# Patient Record
Sex: MALE | Race: OTHER | NOT HISPANIC OR LATINO | URBAN - METROPOLITAN AREA
[De-identification: names, ages, dates, MRNs, and addresses within clinical notes are randomized per-mention and may not be internally consistent; named-entity substitution may affect disease eponyms.]

---

## 2020-02-09 ENCOUNTER — INPATIENT (INPATIENT)
Facility: HOSPITAL | Age: 60
LOS: 3 days | Discharge: HOME | DRG: 552 | End: 2020-02-13
Attending: INTERNAL MEDICINE | Admitting: INTERNAL MEDICINE
Payer: COMMERCIAL

## 2020-02-09 VITALS
WEIGHT: 210.1 LBS | RESPIRATION RATE: 18 BRPM | TEMPERATURE: 98 F | HEIGHT: 72 IN | DIASTOLIC BLOOD PRESSURE: 70 MMHG | OXYGEN SATURATION: 99 % | HEART RATE: 61 BPM | SYSTOLIC BLOOD PRESSURE: 112 MMHG

## 2020-02-09 DIAGNOSIS — M54.9 DORSALGIA, UNSPECIFIED: ICD-10-CM

## 2020-02-09 LAB
ALBUMIN SERPL ELPH-MCNC: 4.1 G/DL — SIGNIFICANT CHANGE UP (ref 3.3–5)
ALP SERPL-CCNC: 57 U/L — SIGNIFICANT CHANGE UP (ref 40–120)
ALT FLD-CCNC: 46 U/L — HIGH (ref 10–45)
ANION GAP SERPL CALC-SCNC: 10 MMOL/L — SIGNIFICANT CHANGE UP (ref 5–17)
APPEARANCE UR: CLEAR — SIGNIFICANT CHANGE UP
APTT BLD: 29 SEC — SIGNIFICANT CHANGE UP (ref 27.5–36.3)
AST SERPL-CCNC: 27 U/L — SIGNIFICANT CHANGE UP (ref 10–40)
BACTERIA # UR AUTO: NEGATIVE — SIGNIFICANT CHANGE UP
BILIRUB SERPL-MCNC: 0.3 MG/DL — SIGNIFICANT CHANGE UP (ref 0.2–1.2)
BILIRUB UR-MCNC: NEGATIVE — SIGNIFICANT CHANGE UP
BUN SERPL-MCNC: 15 MG/DL — SIGNIFICANT CHANGE UP (ref 7–23)
CALCIUM SERPL-MCNC: 9.5 MG/DL — SIGNIFICANT CHANGE UP (ref 8.4–10.5)
CHLORIDE SERPL-SCNC: 104 MMOL/L — SIGNIFICANT CHANGE UP (ref 96–108)
CO2 SERPL-SCNC: 25 MMOL/L — SIGNIFICANT CHANGE UP (ref 22–31)
COLOR SPEC: COLORLESS — SIGNIFICANT CHANGE UP
CREAT SERPL-MCNC: 0.75 MG/DL — SIGNIFICANT CHANGE UP (ref 0.5–1.3)
DIFF PNL FLD: NEGATIVE — SIGNIFICANT CHANGE UP
EPI CELLS # UR: 0 /HPF — SIGNIFICANT CHANGE UP
GLUCOSE SERPL-MCNC: 207 MG/DL — HIGH (ref 70–99)
GLUCOSE UR QL: NEGATIVE — SIGNIFICANT CHANGE UP
HCT VFR BLD CALC: 46.7 % — SIGNIFICANT CHANGE UP (ref 39–50)
HGB BLD-MCNC: 15.2 G/DL — SIGNIFICANT CHANGE UP (ref 13–17)
HYALINE CASTS # UR AUTO: 0 /LPF — SIGNIFICANT CHANGE UP (ref 0–2)
INR BLD: 1.03 RATIO — SIGNIFICANT CHANGE UP (ref 0.88–1.16)
KETONES UR-MCNC: NEGATIVE — SIGNIFICANT CHANGE UP
LEUKOCYTE ESTERASE UR-ACNC: NEGATIVE — SIGNIFICANT CHANGE UP
MCHC RBC-ENTMCNC: 29.7 PG — SIGNIFICANT CHANGE UP (ref 27–34)
MCHC RBC-ENTMCNC: 32.5 GM/DL — SIGNIFICANT CHANGE UP (ref 32–36)
MCV RBC AUTO: 91.4 FL — SIGNIFICANT CHANGE UP (ref 80–100)
NITRITE UR-MCNC: NEGATIVE — SIGNIFICANT CHANGE UP
NRBC # BLD: 0 /100 WBCS — SIGNIFICANT CHANGE UP (ref 0–0)
PH UR: 7.5 — SIGNIFICANT CHANGE UP (ref 5–8)
PLATELET # BLD AUTO: 188 K/UL — SIGNIFICANT CHANGE UP (ref 150–400)
POTASSIUM SERPL-MCNC: 4.2 MMOL/L — SIGNIFICANT CHANGE UP (ref 3.5–5.3)
POTASSIUM SERPL-SCNC: 4.2 MMOL/L — SIGNIFICANT CHANGE UP (ref 3.5–5.3)
PROT SERPL-MCNC: 6.8 G/DL — SIGNIFICANT CHANGE UP (ref 6–8.3)
PROT UR-MCNC: NEGATIVE — SIGNIFICANT CHANGE UP
PROTHROM AB SERPL-ACNC: 11.7 SEC — SIGNIFICANT CHANGE UP (ref 10–12.9)
RBC # BLD: 5.11 M/UL — SIGNIFICANT CHANGE UP (ref 4.2–5.8)
RBC # FLD: 12.3 % — SIGNIFICANT CHANGE UP (ref 10.3–14.5)
RBC CASTS # UR COMP ASSIST: 1 /HPF — SIGNIFICANT CHANGE UP (ref 0–4)
SODIUM SERPL-SCNC: 139 MMOL/L — SIGNIFICANT CHANGE UP (ref 135–145)
SP GR SPEC: 1.01 — LOW (ref 1.01–1.02)
UROBILINOGEN FLD QL: NEGATIVE — SIGNIFICANT CHANGE UP
WBC # BLD: 7.01 K/UL — SIGNIFICANT CHANGE UP (ref 3.8–10.5)
WBC # FLD AUTO: 7.01 K/UL — SIGNIFICANT CHANGE UP (ref 3.8–10.5)
WBC UR QL: 0 /HPF — SIGNIFICANT CHANGE UP (ref 0–5)

## 2020-02-09 PROCEDURE — 72100 X-RAY EXAM L-S SPINE 2/3 VWS: CPT | Mod: 26

## 2020-02-09 PROCEDURE — 72148 MRI LUMBAR SPINE W/O DYE: CPT | Mod: 26

## 2020-02-09 PROCEDURE — 99285 EMERGENCY DEPT VISIT HI MDM: CPT

## 2020-02-09 RX ORDER — DEXAMETHASONE 0.5 MG/5ML
4 ELIXIR ORAL
Refills: 0 | Status: DISCONTINUED | OUTPATIENT
Start: 2020-02-09 | End: 2020-02-10

## 2020-02-09 RX ORDER — LIDOCAINE 4 G/100G
1 CREAM TOPICAL ONCE
Refills: 0 | Status: COMPLETED | OUTPATIENT
Start: 2020-02-09 | End: 2020-02-09

## 2020-02-09 RX ORDER — ACETAMINOPHEN 500 MG
650 TABLET ORAL EVERY 6 HOURS
Refills: 0 | Status: DISCONTINUED | OUTPATIENT
Start: 2020-02-09 | End: 2020-02-13

## 2020-02-09 RX ORDER — DEXAMETHASONE 0.5 MG/5ML
10 ELIXIR ORAL ONCE
Refills: 0 | Status: COMPLETED | OUTPATIENT
Start: 2020-02-09 | End: 2020-02-09

## 2020-02-09 RX ORDER — DIAZEPAM 5 MG
5 TABLET ORAL EVERY 8 HOURS
Refills: 0 | Status: DISCONTINUED | OUTPATIENT
Start: 2020-02-09 | End: 2020-02-11

## 2020-02-09 RX ORDER — INFLUENZA VIRUS VACCINE 15; 15; 15; 15 UG/.5ML; UG/.5ML; UG/.5ML; UG/.5ML
0.5 SUSPENSION INTRAMUSCULAR ONCE
Refills: 0 | Status: COMPLETED | OUTPATIENT
Start: 2020-02-09 | End: 2020-02-13

## 2020-02-09 RX ORDER — ACETAMINOPHEN 500 MG
975 TABLET ORAL ONCE
Refills: 0 | Status: COMPLETED | OUTPATIENT
Start: 2020-02-09 | End: 2020-02-09

## 2020-02-09 RX ORDER — DIAZEPAM 5 MG
5 TABLET ORAL ONCE
Refills: 0 | Status: DISCONTINUED | OUTPATIENT
Start: 2020-02-09 | End: 2020-02-09

## 2020-02-09 RX ADMIN — LIDOCAINE 1 PATCH: 4 CREAM TOPICAL at 16:35

## 2020-02-09 RX ADMIN — Medication 4 MILLIGRAM(S): at 21:27

## 2020-02-09 RX ADMIN — Medication 5 MILLIGRAM(S): at 21:27

## 2020-02-09 RX ADMIN — Medication 10 MILLIGRAM(S): at 16:35

## 2020-02-09 RX ADMIN — Medication 5 MILLIGRAM(S): at 16:34

## 2020-02-09 RX ADMIN — Medication 975 MILLIGRAM(S): at 16:34

## 2020-02-09 NOTE — ED PROVIDER NOTE - ATTENDING CONTRIBUTION TO CARE
I performed a history and physical exam of the patient and discussed their management with the resident/ACP. I reviewed the resident/ACP's note and agree with the documented findings and plan of care.     59M w/ severe back pain x 10 days, worse today after seeing a chiropractor. States that he tried Flexeril which did not help. Exacerbated by any movement, alleviated with keeping his knees elevated. Agree with rest of HPI as above.  PE remarkable for +SLR R side, no midline spinal tenderness, ROM limited sec. pain, strength 5/5 B/L UE, strength 4+/5 (pain limited) B/L LE, reflexes 2+, no saddle aesthesia, SILT.  Impression:  Back pain, no signs of cord compression syndrome but given unable to ambulate will provide pain medications, re-evaluate, if continues to be unable to ambulate will require admission for further work up and PT.

## 2020-02-09 NOTE — H&P ADULT - ATTENDING COMMENTS
dvt ppx (IPCs)  admit to inpatient  discussed with ER staff and neurosurgery.    Ja Waddell,   internal medicine  [phone]

## 2020-02-09 NOTE — ED ADULT NURSE NOTE - OBJECTIVE STATEMENT
59 y.o. male with PMH sciatica, chronic back pain/herniated discs presented to ED by EMS c/o lower back pain. Pt states he went to chiropractor around noon, and about an hour after he started having worsening back pain, unable to move, and unable to walk. Pt states he also went to same chiropractor on friday. Pt denies numbness, tingling, or loss of sensation in bilateral legs. Pt states pain radiates from lower back down legs, but is worse in R leg. Pt states he needs to lay on side to feel better. Pt denies loss of bowel or bladder function or SOB. Upon assessment, no loss of sensation in bilateral legs noted, skin warm and appropriate color, positive pedal pulses, legs able to be lifted to a certain point and pain worsened, R leg unable to lift as high as left and pain greater as verbalized by pt. Pt safety and comfort provided.

## 2020-02-09 NOTE — ED ADULT NURSE NOTE - NSIMPLEMENTINTERV_GEN_ALL_ED
Implemented All Fall Risk Interventions:  [place] to call system. Call bell, personal items and telephone within reach. Instruct patient to call for assistance. Room bathroom lighting operational. Non-slip footwear when patient is off stretcher. Physically safe environment: no spills, clutter or unnecessary equipment. Stretcher in lowest position, wheels locked, appropriate side rails in place. Provide visual cue, wrist band, yellow gown, etc. Monitor gait and stability. Monitor for mental status changes and reorient to person, place, and time. Review medications for side effects contributing to fall risk. Reinforce activity limits and safety measures with patient and family.

## 2020-02-09 NOTE — ED PROVIDER NOTE - OBJECTIVE STATEMENT
60 yo male in room 6 brought into the ER by EMS for evaluation of lower back pain. Pt states "I have had worsening back pain for the past 10 days. Pt states "I I had episodes of back pain 20 years ago and Indomethacin really helped. I had no further pain since then until ten days ago. I started to have pain when I was in the shower and I leaned over and immediately felt the pain. The pain progressively became worse. Friday I went to a chiropractor and I also went today. After today;s visit I felt much worse pain and now I can not stand or walk without a great deal of pain". Pt denies numbness tingling, weakness, loss of bowel or bladder function. Denies fevers/chills.   PT took a long motrin type drug of unknown name and flexiril prior to arrival.

## 2020-02-09 NOTE — H&P ADULT - HISTORY OF PRESENT ILLNESS
61 y/o M with no significant past med hx presenting for progressively worsening low back pain. He has had 2 weeks of waxing and waning low back pain with spasms. iT worsened the last 3 days. Had chiropractic treatments, and was taking motrin 600mg q8 hours the last 2 days, as well as toradol 60mg IM. He went for a chiropractic treatment earlier today and a few minutes later when back at home had severe spasm with weakness in the legs, to the point he was unable to support his body. He fell to the floor, and was unable to move due to spasm and pain. No fever or chills, no CP or SOB. no weight changes. denies saddle anesthesia. no bowel or bladder changes.  has radiating pain to the right leg, though no weakness at this time. now improving with steroids and valium.

## 2020-02-09 NOTE — H&P ADULT - NSHPPHYSICALEXAM_GEN_ALL_CORE
aaox3, in pain  rrr s1 s2 no murmurs  clear lungs no wheezing  abd soft nt nd  no edema  5/5 distal strength  sensation grossly intact  no focl deficits  severe pain with movement at trunk.   no rashes  cn ii-xii grossly intact.

## 2020-02-09 NOTE — H&P ADULT - NSHPLABSRESULTS_GEN_ALL_CORE
CBC Full  -  ( 09 Feb 2020 18:29 )  WBC Count : 7.01 K/uL  RBC Count : 5.11 M/uL  Hemoglobin : 15.2 g/dL  Hematocrit : 46.7 %  Platelet Count - Automated : 188 K/uL  Mean Cell Volume : 91.4 fl  Mean Cell Hemoglobin : 29.7 pg  Mean Cell Hemoglobin Concentration : 32.5 gm/dL  Auto Neutrophil # : x  Auto Lymphocyte # : x  Auto Monocyte # : x  Auto Eosinophil # : x  Auto Basophil # : x  Auto Neutrophil % : x  Auto Lymphocyte % : x  Auto Monocyte % : x  Auto Eosinophil % : x  Auto Basophil % : x

## 2020-02-09 NOTE — H&P ADULT - PROBLEM SELECTOR PLAN 1
decadron n4mg qd 6  tylenol 650mg qd  valium 5 mg tid prn   fall precuations  neurosurgery consult, Dr Perez  await MRI L spine.

## 2020-02-10 DIAGNOSIS — R73.09 OTHER ABNORMAL GLUCOSE: ICD-10-CM

## 2020-02-10 DIAGNOSIS — M54.16 RADICULOPATHY, LUMBAR REGION: ICD-10-CM

## 2020-02-10 LAB
CULTURE RESULTS: SIGNIFICANT CHANGE UP
HCV AB S/CO SERPL IA: 0.25 S/CO — SIGNIFICANT CHANGE UP (ref 0–0.99)
HCV AB SERPL-IMP: SIGNIFICANT CHANGE UP
SPECIMEN SOURCE: SIGNIFICANT CHANGE UP

## 2020-02-10 RX ORDER — DEXTROSE 50 % IN WATER 50 %
12.5 SYRINGE (ML) INTRAVENOUS ONCE
Refills: 0 | Status: DISCONTINUED | OUTPATIENT
Start: 2020-02-10 | End: 2020-02-13

## 2020-02-10 RX ORDER — DEXTROSE 50 % IN WATER 50 %
25 SYRINGE (ML) INTRAVENOUS ONCE
Refills: 0 | Status: DISCONTINUED | OUTPATIENT
Start: 2020-02-10 | End: 2020-02-13

## 2020-02-10 RX ORDER — DEXAMETHASONE 0.5 MG/5ML
6 ELIXIR ORAL EVERY 6 HOURS
Refills: 0 | Status: DISCONTINUED | OUTPATIENT
Start: 2020-02-10 | End: 2020-02-13

## 2020-02-10 RX ORDER — DEXTROSE 50 % IN WATER 50 %
15 SYRINGE (ML) INTRAVENOUS ONCE
Refills: 0 | Status: DISCONTINUED | OUTPATIENT
Start: 2020-02-10 | End: 2020-02-13

## 2020-02-10 RX ORDER — INSULIN LISPRO 100/ML
VIAL (ML) SUBCUTANEOUS
Refills: 0 | Status: DISCONTINUED | OUTPATIENT
Start: 2020-02-10 | End: 2020-02-13

## 2020-02-10 RX ORDER — OXYCODONE AND ACETAMINOPHEN 5; 325 MG/1; MG/1
1 TABLET ORAL EVERY 4 HOURS
Refills: 0 | Status: DISCONTINUED | OUTPATIENT
Start: 2020-02-10 | End: 2020-02-13

## 2020-02-10 RX ORDER — GLUCAGON INJECTION, SOLUTION 0.5 MG/.1ML
1 INJECTION, SOLUTION SUBCUTANEOUS ONCE
Refills: 0 | Status: DISCONTINUED | OUTPATIENT
Start: 2020-02-10 | End: 2020-02-13

## 2020-02-10 RX ORDER — INSULIN LISPRO 100/ML
VIAL (ML) SUBCUTANEOUS AT BEDTIME
Refills: 0 | Status: DISCONTINUED | OUTPATIENT
Start: 2020-02-10 | End: 2020-02-13

## 2020-02-10 RX ORDER — SODIUM CHLORIDE 9 MG/ML
1000 INJECTION, SOLUTION INTRAVENOUS
Refills: 0 | Status: DISCONTINUED | OUTPATIENT
Start: 2020-02-10 | End: 2020-02-13

## 2020-02-10 RX ADMIN — Medication 3: at 12:53

## 2020-02-10 RX ADMIN — Medication 5 MILLIGRAM(S): at 06:08

## 2020-02-10 RX ADMIN — Medication 4 MILLIGRAM(S): at 06:08

## 2020-02-10 RX ADMIN — OXYCODONE AND ACETAMINOPHEN 1 TABLET(S): 5; 325 TABLET ORAL at 10:00

## 2020-02-10 RX ADMIN — Medication 5 MILLIGRAM(S): at 21:30

## 2020-02-10 RX ADMIN — Medication 1: at 21:46

## 2020-02-10 RX ADMIN — OXYCODONE AND ACETAMINOPHEN 1 TABLET(S): 5; 325 TABLET ORAL at 09:37

## 2020-02-10 RX ADMIN — Medication 5 MILLIGRAM(S): at 12:54

## 2020-02-10 RX ADMIN — Medication 6 MILLIGRAM(S): at 18:28

## 2020-02-10 RX ADMIN — Medication 6 MILLIGRAM(S): at 12:53

## 2020-02-10 NOTE — PROGRESS NOTE ADULT - SUBJECTIVE AND OBJECTIVE BOX
seen and examined. no fever or chills. still with lots of pain with movement. States feels like his core is weak, and cannot support his body. right sided radiating pains slightly improving. no distal weakness. no bowel or bladder changes. no saddle anesthesia.     MEDICATIONS  (STANDING):  dexAMETHasone     Tablet 4 milliGRAM(s) Oral four times a day  diazepam    Tablet 5 milliGRAM(s) Oral every 8 hours  influenza   Vaccine 0.5 milliLiter(s) IntraMuscular once    MEDICATIONS  (PRN):  acetaminophen   Tablet .. 650 milliGRAM(s) Oral every 6 hours PRN Mild Pain (1 - 3), Moderate Pain (4 - 6), Severe Pain (7 - 10)      Allergies    No Known Allergies    Intolerances        Vital Signs Last 24 Hrs  T(C): 36.6 (10 Feb 2020 07:23), Max: 36.7 (2020 21:20)  T(F): 97.8 (10 Feb 2020 07:23), Max: 98 (2020 21:20)  HR: 72 (10 Feb 2020 07:23) (61 - 72)  BP: 110/70 (10 Feb 2020 07:23) (110/70 - 135/94)  BP(mean): --  RR: 18 (10 Feb 2020 07:23) (18 - 18)  SpO2: 94% (10 Feb 2020 07:23) (94% - 100%)    PHYSICAL EXAM:    aaox3, nad  rrr s1 s2 no murmurs  clear lungs non labored breathing.  abd soft nt nd  + SLR on the right  5/5 distal strength bilaterally. 5/5 at hip flexors.   no rashes  cn ii-xii grossly intact  no focal deficits      LABS:                        15.2   7.01  )-----------( 188      ( 2020 18:29 )             46.7     02-09    139  |  104  |  15  ----------------------------<  207<H>  4.2   |  25  |  0.75    Ca    9.5      2020 18:30    TPro  6.8  /  Alb  4.1  /  TBili  0.3  /  DBili  x   /  AST  27  /  ALT  46<H>  /  AlkPhos  57  02-09    PT/INR - ( 2020 18:29 )   PT: 11.7 sec;   INR: 1.03 ratio         PTT - ( 2020 18:29 )  PTT:29.0 sec  Urinalysis Basic - ( 2020 17:16 )    Color: Colorless / Appearance: Clear / S.007 / pH: x  Gluc: x / Ketone: Negative  / Bili: Negative / Urobili: Negative   Blood: x / Protein: Negative / Nitrite: Negative   Leuk Esterase: Negative / RBC: 1 /hpf / WBC 0 /HPF   Sq Epi: x / Non Sq Epi: 0 /hpf / Bacteria: Negative        RADIOLOGY & ADDITIONAL STUDIES:  xray no emergent findings  MRI L spine report pending

## 2020-02-10 NOTE — PROGRESS NOTE ADULT - PROBLEM SELECTOR PLAN 1
interventional radiology eval for possible Epidural injection  neurosurgery consult, Dr Perez, discussed. he will see patient later today  decadron 6mg q6 hours  valium 5mg tid   start percocet 1 tab po q8 hours prn pain  fall precautions.

## 2020-02-10 NOTE — CONSULT NOTE ADULT - ASSESSMENT
58 yo M with 2 weeks of progressively worsening low back pain and R sided radicular features down thigh MRI showing L3-4 disc  -continue decadron  -pain management  -Right L3-4 NI if possible during admission  -OOB  -PT   -f/u as outpatient

## 2020-02-10 NOTE — CONSULT NOTE ADULT - SUBJECTIVE AND OBJECTIVE BOX
60 yo M s/p recent travels presents with 2 weeks of low back pain progressively worsened over last 2 days. Pt had history of LBP treated with indocin 20 years ago.  Pt denies trauma.  Pt went to chiropractor 2 days ago and felt pain worsen.  Denies fever chills.  Pain in R leg mostly down anterior thigh above knee.    On exam the patient has full strength in Bilateral LE's proximal and distal  R knee extension limited by back pain    MRI of L-spine shows multilevel degenerative disc disease  R L3-4 paracentral disc herniation with lateral recess stenosis

## 2020-02-11 LAB — HBA1C BLD-MCNC: 7.1 % — HIGH (ref 4–5.6)

## 2020-02-11 PROCEDURE — 99222 1ST HOSP IP/OBS MODERATE 55: CPT

## 2020-02-11 RX ORDER — HEPARIN SODIUM 5000 [USP'U]/ML
5000 INJECTION INTRAVENOUS; SUBCUTANEOUS EVERY 12 HOURS
Refills: 0 | Status: DISCONTINUED | OUTPATIENT
Start: 2020-02-11 | End: 2020-02-13

## 2020-02-11 RX ORDER — NALOXEGOL OXALATE 12.5 MG/1
25 TABLET, FILM COATED ORAL DAILY
Refills: 0 | Status: DISCONTINUED | OUTPATIENT
Start: 2020-02-11 | End: 2020-02-13

## 2020-02-11 RX ORDER — SENNA PLUS 8.6 MG/1
2 TABLET ORAL AT BEDTIME
Refills: 0 | Status: DISCONTINUED | OUTPATIENT
Start: 2020-02-11 | End: 2020-02-11

## 2020-02-11 RX ORDER — DIAZEPAM 5 MG
10 TABLET ORAL
Refills: 0 | Status: DISCONTINUED | OUTPATIENT
Start: 2020-02-11 | End: 2020-02-13

## 2020-02-11 RX ORDER — CHLORHEXIDINE GLUCONATE 213 G/1000ML
1 SOLUTION TOPICAL DAILY
Refills: 0 | Status: DISCONTINUED | OUTPATIENT
Start: 2020-02-11 | End: 2020-02-13

## 2020-02-11 RX ORDER — SENNA PLUS 8.6 MG/1
2 TABLET ORAL EVERY 12 HOURS
Refills: 0 | Status: DISCONTINUED | OUTPATIENT
Start: 2020-02-11 | End: 2020-02-13

## 2020-02-11 RX ADMIN — SENNA PLUS 2 TABLET(S): 8.6 TABLET ORAL at 11:58

## 2020-02-11 RX ADMIN — SENNA PLUS 2 TABLET(S): 8.6 TABLET ORAL at 18:10

## 2020-02-11 RX ADMIN — Medication 6 MILLIGRAM(S): at 18:10

## 2020-02-11 RX ADMIN — Medication 3: at 12:42

## 2020-02-11 RX ADMIN — OXYCODONE AND ACETAMINOPHEN 1 TABLET(S): 5; 325 TABLET ORAL at 08:19

## 2020-02-11 RX ADMIN — Medication 2: at 09:07

## 2020-02-11 RX ADMIN — Medication 6 MILLIGRAM(S): at 08:19

## 2020-02-11 RX ADMIN — Medication 4: at 18:10

## 2020-02-11 RX ADMIN — HEPARIN SODIUM 5000 UNIT(S): 5000 INJECTION INTRAVENOUS; SUBCUTANEOUS at 18:10

## 2020-02-11 RX ADMIN — Medication 3: at 21:37

## 2020-02-11 RX ADMIN — NALOXEGOL OXALATE 25 MILLIGRAM(S): 12.5 TABLET, FILM COATED ORAL at 11:56

## 2020-02-11 RX ADMIN — Medication 6 MILLIGRAM(S): at 11:56

## 2020-02-11 RX ADMIN — OXYCODONE AND ACETAMINOPHEN 1 TABLET(S): 5; 325 TABLET ORAL at 11:59

## 2020-02-11 RX ADMIN — CHLORHEXIDINE GLUCONATE 1 APPLICATION(S): 213 SOLUTION TOPICAL at 11:59

## 2020-02-11 RX ADMIN — Medication 6 MILLIGRAM(S): at 00:47

## 2020-02-11 RX ADMIN — Medication 10 MILLIGRAM(S): at 18:09

## 2020-02-11 RX ADMIN — Medication 10 MILLIGRAM(S): at 09:19

## 2020-02-11 NOTE — CONSULT NOTE ADULT - ASSESSMENT
Patient is a 59y old  Male with intractable low back pain with right sciatica.    Would continue decadron as per primary service.  Continue percocet PRN and valium PRN.  Suggest to taper valium down and off before surgery, order robaxin 750 mg Q6 hours PRN.  Bowel regimen.  Monitor for sedation and respiratory supression    Pt states he is awaiting a LSO brace, not advisable for lumbar HNP or for long term use.  Would suggest strengthening low back muscles with PT.  Discussed pain management, but pt states he is trying to avoid any injection therapy.  If needed, local interventional pain management specialists were discussed (Dr Carpio, Dr Thomson, Dr Khoury).      Minutes spent on total encounter: 45 minutes      [facility]  [phone]

## 2020-02-11 NOTE — PROGRESS NOTE ADULT - PROBLEM SELECTOR PLAN 1
discussed with neurosurgery  LSO brace  increase valium to 10mg BID  percocet Q4 prn  bowel regimen.   decadron 6mg q6 hours  fall precautions.  PT consult

## 2020-02-11 NOTE — CHART NOTE - NSCHARTNOTEFT_GEN_A_CORE
Measure fit and deliver [place] custom fit LSO. Donned for fit orthosis fit well. Reviewed application skin precautions and care. Written instructions and contact information given. To notify office with any issues questions or concerns.  Lane BOLANOS  [facility]  [phone]

## 2020-02-11 NOTE — CONSULT NOTE ADULT - SUBJECTIVE AND OBJECTIVE BOX
Chief Complaint:  Patient is a 59y old  Male with intractable low back pain with right sciatica.    HPI:  59 y/o M with no significant past med hx presenting for progressively worsening low back pain. He has had 2 weeks of waxing and waning low back pain with spasms which started after he lifted his leg in the shower.  Had chiropractic treatments (which have worked in the past), and was taking motrin 600mg q8 hours the last 2 days, as well as toradol 60mg IM.  After his last chiropractic treatment when he returned home had severe spasm with weakness in the legs, to the point he was unable to support his body.  He fell to the floor, and was unable to move due to spasm and pain.   Denies saddle anesthesia. no bowel or bladder changes.  Has radiating pain to the right leg in a sciatica type fashion.  No subjective weakness at this time. Pain currently  improving with steroids, percocet and valium.       Current Pain Score: 2/ 10    Current out- patient pain regimen: none    Out Patient Pain Management provider: none    [facility] Prescription Monitoring Program: Reference #[id number]    PAST MEDICAL & SURGICAL HISTORY:  No pertinent past medical history  No significant past surgical history    FAMILY HISTORY:  No pertinent family history in first degree relatives    SOCIAL HISTORY:  Tobacco Use: denies  Alcohol Use: denies  Illcicit Drug Use: denies    Opioid Risk Tool (ORT-OUD) Score: low    Allergies    No Known Allergies    Intolerances    none    REVIEW OF SYSTEMS:  CONSTITUTIONAL: No fever, weight loss, or fatigue  NEUROLOGICAL: No headaches, memory loss, loss of strength, numbness, tremors, dizziness or blurred vision  RESPIRATORY: No shortness of breath  CARDIOVASCULAR: No chest pain, palpitations  GASTROINTESTINAL: No abdominal pain, nausea, vomiting, diarrhea or constipation.  : no bladder incontinence/retention  MUSCULOSKELETAL: + back pain with right lower extremity pain, + low back spasm; denies lower motor strength weakness; denies saddle anesthesia  SKIN: No itching, burning, rashes, or lesions   PSYCHIATRIC: No depression, anxiety, mood swings, or difficulty sleeping      MEDICATIONS  (STANDING):  chlorhexidine 2% Cloths 1 Application(s) Topical daily  dexAMETHasone     Tablet 6 milliGRAM(s) Oral every 6 hours  dextrose 5%. 1000 milliLiter(s) (50 mL/Hr) IV Continuous <Continuous>  dextrose 50% Injectable 12.5 Gram(s) IV Push once  dextrose 50% Injectable 25 Gram(s) IV Push once  dextrose 50% Injectable 25 Gram(s) IV Push once  diazepam    Tablet 10 milliGRAM(s) Oral two times a day  heparin  Injectable 5000 Unit(s) SubCutaneous every 12 hours  influenza   Vaccine 0.5 milliLiter(s) IntraMuscular once  insulin lispro (HumaLOG) corrective regimen sliding scale   SubCutaneous three times a day before meals  insulin lispro (HumaLOG) corrective regimen sliding scale   SubCutaneous at bedtime  naloxegol 25 milliGRAM(s) Oral daily  senna 2 Tablet(s) Oral every 12 hours    MEDICATIONS  (PRN):  acetaminophen   Tablet .. 650 milliGRAM(s) Oral every 6 hours PRN Mild Pain (1 - 3), Moderate Pain (4 - 6), Severe Pain (7 - 10)  dextrose 40% Gel 15 Gram(s) Oral once PRN Blood Glucose LESS THAN 70 milliGRAM(s)/deciliter  glucagon  Injectable 1 milliGRAM(s) IntraMuscular once PRN Glucose LESS THAN 70 milligrams/deciliter  oxycodone    5 mG/acetaminophen 325 mG 1 Tablet(s) Oral every 4 hours PRN Moderate Pain (4 - 6)      PHYSICAL EXAM  Seen at bedside, NAD, well-groomed, well-developed, no signs of toxicity  Alert & Oriented X3, Good concentration; Cranial Nerves II-XII intact; sensory exam intact  Lungs Clear to auscultation bilaterally, no rales or rhonchi  Heart Regular rate and rhythm, S1 S; no JVD  Abdomen Soft, Nontender, Nondistended; Bowel sounds present, no BM; voiding; appetite good  Extremities 2+ Peripheral Pulses, No cyanosis or edema  Motor Strength 5/5 B/L upper and lower extremities; moves all extremities equally; Straight Leg Raise + on right leg at approx 20 degrees; OOB ambulating with walker  SKIN: No rashes or lesions    Vital Signs:  T(C): 36.8 (02-11-20 @ 07:43)  HR: 80 (02-11-20 @ 07:43)  BP: 116/76 (02-11-20 @ 07:43)  RR: 18 (02-11-20 @ 07:43)  SpO2: 95% (02-11-20 @ 07:43)    Pertinent labs/radiology:  AM labs reviewed.  Lumbar xray reviewed.  Lumbar MRI reviewed: L3-4 right HNP with compression of the descending right L4 nerve.

## 2020-02-12 PROCEDURE — 99231 SBSQ HOSP IP/OBS SF/LOW 25: CPT

## 2020-02-12 RX ADMIN — OXYCODONE AND ACETAMINOPHEN 1 TABLET(S): 5; 325 TABLET ORAL at 07:51

## 2020-02-12 RX ADMIN — Medication 10 MILLIGRAM(S): at 06:51

## 2020-02-12 RX ADMIN — Medication 4: at 18:02

## 2020-02-12 RX ADMIN — CHLORHEXIDINE GLUCONATE 1 APPLICATION(S): 213 SOLUTION TOPICAL at 11:34

## 2020-02-12 RX ADMIN — Medication 6 MILLIGRAM(S): at 06:56

## 2020-02-12 RX ADMIN — HEPARIN SODIUM 5000 UNIT(S): 5000 INJECTION INTRAVENOUS; SUBCUTANEOUS at 17:14

## 2020-02-12 RX ADMIN — Medication 3: at 21:54

## 2020-02-12 RX ADMIN — Medication 3: at 07:57

## 2020-02-12 RX ADMIN — Medication 10 MILLIGRAM(S): at 17:14

## 2020-02-12 RX ADMIN — OXYCODONE AND ACETAMINOPHEN 1 TABLET(S): 5; 325 TABLET ORAL at 06:51

## 2020-02-12 RX ADMIN — Medication 6 MILLIGRAM(S): at 11:34

## 2020-02-12 RX ADMIN — Medication 6 MILLIGRAM(S): at 17:14

## 2020-02-12 RX ADMIN — SENNA PLUS 2 TABLET(S): 8.6 TABLET ORAL at 06:51

## 2020-02-12 RX ADMIN — Medication 6 MILLIGRAM(S): at 00:06

## 2020-02-12 RX ADMIN — NALOXEGOL OXALATE 25 MILLIGRAM(S): 12.5 TABLET, FILM COATED ORAL at 12:21

## 2020-02-12 RX ADMIN — Medication 3: at 13:25

## 2020-02-12 RX ADMIN — HEPARIN SODIUM 5000 UNIT(S): 5000 INJECTION INTRAVENOUS; SUBCUTANEOUS at 06:52

## 2020-02-12 NOTE — PROGRESS NOTE ADULT - PROBLEM SELECTOR PLAN 1
discussed with neurosurgery  LSO brace - discussed should be used for comfort, and not long term  stressed importance of weight loss and core strengthening with PT once out of current flare.

## 2020-02-12 NOTE — PROGRESS NOTE ADULT - PROBLEM SELECTOR PLAN 2
plan for discharge tomorrow morning, with walker for prn use at home.  LSO  decrease valium to 5mg BID  oxycodone 5mg TID prn   plan for prednisone taper 40mg qd x 3 days, 30mg qd for 3days., 20mg qd for 3 days, then 10mg daily for 3 days  decrease decadron to 4mg q8 hours  will plan to discharge on movantik and senna.

## 2020-02-12 NOTE — PROGRESS NOTE ADULT - SUBJECTIVE AND OBJECTIVE BOX
seen and examined. states greatly improving. now with LSO. walking much better. still hesitant with prolonged sitting. no BM yet. No CP or SOB. no radiating pains at current. transitions improving. admits to urinary urgency    MEDICATIONS  (STANDING):  chlorhexidine 2% Cloths 1 Application(s) Topical daily  dexAMETHasone     Tablet 6 milliGRAM(s) Oral every 6 hours  dextrose 5%. 1000 milliLiter(s) (50 mL/Hr) IV Continuous <Continuous>  dextrose 50% Injectable 12.5 Gram(s) IV Push once  dextrose 50% Injectable 25 Gram(s) IV Push once  dextrose 50% Injectable 25 Gram(s) IV Push once  diazepam    Tablet 10 milliGRAM(s) Oral two times a day  heparin  Injectable 5000 Unit(s) SubCutaneous every 12 hours  influenza   Vaccine 0.5 milliLiter(s) IntraMuscular once  insulin lispro (HumaLOG) corrective regimen sliding scale   SubCutaneous three times a day before meals  insulin lispro (HumaLOG) corrective regimen sliding scale   SubCutaneous at bedtime  naloxegol 25 milliGRAM(s) Oral daily  senna 2 Tablet(s) Oral every 12 hours    MEDICATIONS  (PRN):  acetaminophen   Tablet .. 650 milliGRAM(s) Oral every 6 hours PRN Mild Pain (1 - 3), Moderate Pain (4 - 6), Severe Pain (7 - 10)  dextrose 40% Gel 15 Gram(s) Oral once PRN Blood Glucose LESS THAN 70 milliGRAM(s)/deciliter  glucagon  Injectable 1 milliGRAM(s) IntraMuscular once PRN Glucose LESS THAN 70 milligrams/deciliter  oxycodone    5 mG/acetaminophen 325 mG 1 Tablet(s) Oral every 4 hours PRN Moderate Pain (4 - 6)      Allergies    No Known Allergies    Intolerances        Vital Signs Last 24 Hrs  T(C): 37 (12 Feb 2020 07:51), Max: 37 (12 Feb 2020 07:51)  T(F): 98.6 (12 Feb 2020 07:51), Max: 98.6 (12 Feb 2020 07:51)  HR: 69 (12 Feb 2020 07:51) (69 - 90)  BP: 122/74 (12 Feb 2020 07:51) (122/74 - 128/76)  BP(mean): --  RR: 18 (12 Feb 2020 07:51) (18 - 18)  SpO2: 94% (12 Feb 2020 07:51) (85% - 94%)    PHYSICAL EXAM:    aaox3m, nad  rrr s1 s2 no murmurs  clear lungs  LSO on  walking independantly. with walker.   no weakness. normal sensation  no rashes    LABS:  A1C 7.1  fingersticks reviewed.     RADIOLOGY & ADDITIONAL STUDIES:

## 2020-02-12 NOTE — PROGRESS NOTE ADULT - SUBJECTIVE AND OBJECTIVE BOX
Chief Complaint:  Patient is a 59y old  Male with intractable low back pain with right sciatica.    HPI:  61 y/o M with no significant past med hx presenting for progressively worsening low back pain. He has had 2 weeks of waxing and waning low back pain with spasms which started after he lifted his leg in the shower.  Had chiropractic treatments (which have worked in the past), and was taking motrin 600mg q8 hours the last 2 days, as well as toradol 60mg IM.  After his last chiropractic treatment when he returned home had severe spasm with weakness in the legs, to the point he was unable to support his body.  He fell to the floor, and was unable to move due to spasm and pain.   Denies saddle anesthesia. no bowel or bladder changes.  Has radiating pain to the right leg in a sciatica type fashion.  No subjective weakness at this time. Pain currently  improving with steroids, percocet and valium.       Current Pain Score: 2/ 10    Current in-patient pain therapy:  MEDICATIONS  (STANDING):  chlorhexidine 2% Cloths 1 Application(s) Topical daily  dexAMETHasone     Tablet 6 milliGRAM(s) Oral every 6 hours  dextrose 5%. 1000 milliLiter(s) (50 mL/Hr) IV Continuous <Continuous>  dextrose 50% Injectable 12.5 Gram(s) IV Push once  dextrose 50% Injectable 25 Gram(s) IV Push once  dextrose 50% Injectable 25 Gram(s) IV Push once  diazepam    Tablet 10 milliGRAM(s) Oral two times a day  heparin  Injectable 5000 Unit(s) SubCutaneous every 12 hours  influenza   Vaccine 0.5 milliLiter(s) IntraMuscular once  insulin lispro (HumaLOG) corrective regimen sliding scale   SubCutaneous three times a day before meals  insulin lispro (HumaLOG) corrective regimen sliding scale   SubCutaneous at bedtime  naloxegol 25 milliGRAM(s) Oral daily  senna 2 Tablet(s) Oral every 12 hours    MEDICATIONS  (PRN):  acetaminophen   Tablet .. 650 milliGRAM(s) Oral every 6 hours PRN Mild Pain (1 - 3), Moderate Pain (4 - 6), Severe Pain (7 - 10)  dextrose 40% Gel 15 Gram(s) Oral once PRN Blood Glucose LESS THAN 70 milliGRAM(s)/deciliter  glucagon  Injectable 1 milliGRAM(s) IntraMuscular once PRN Glucose LESS THAN 70 milligrams/deciliter  oxycodone    5 mG/acetaminophen 325 mG 1 Tablet(s) Oral every 4 hours PRN Moderate Pain (4 - 6)      PHYSICAL EXAM  Seen while ambulating in the hallway, LSO brace on and utilizing walker with PT.  Alert & Oriented X3,  NAD, appears comfortable.  Motor Strength 5/5 B/L upper and lower extremities; moves all extremities equally; gait steady.  States he is feeling much improved, no new complaints.    T(C): 37 (02-12-20 @ 07:51)  HR: 69 (02-12-20 @ 07:51)  BP: 122/74 (02-12-20 @ 07:51)  RR: 18 (02-12-20 @ 07:51)  SpO2: 94% (02-12-20 @ 07:51)      Pertinent labs, radiology, additional studies:  Labs reviewed, glucose elevated, HA1C 7.1

## 2020-02-12 NOTE — PHYSICAL THERAPY INITIAL EVALUATION ADULT - SITTING BALANCE: DYNAMIC
Reason for admission   hyperglycemia  child disease relapse     HPI  This is a pleasant 60 year old female long standing history of Wegener's granulomatosis which per report has been resistant to cyclophosphamide. She has an extensive treatment history which Is detailed in her consultants note. She presented to follow up with Dr Zeng (see his note), from where is directed for admission. She refers that over the last month has had an array of progressive symptoms including fatigue/tiredness, dyspnea which has progressive worsened with exertion, anorexia, dry cough. In addition, she has been experiencing urinary symptoms of pressure and frequency including in recent days. During the clinic visit her was as high as 460s, which she corroborates has been the case at home ranging from 400-500 despite actively using her insulin. Dr Zeng, has requested her admission for evaluation and management of her symptoms ; there is concern as to whether this may recurring disease.      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   • Closed fracture of humerus 6/16/2016   • COPD (chronic obstructive pulmonary disease) (CMS/[facility])    • Depression    • Developmental dyslexia    • Diabetes mellitus (CMS/[facility])     DM 2, treated with insulin   • Difficult intubation    • Fatty liver    • GERD (esophageal reflux)    • H/O tracheostomy    • Hyperlipidemia    • Hypertension    • Immunosuppression (CMS/[facility])     chronic   • Mitral valve disorders(424.0)    • Osteoarthrosis     lower leg   • Osteopenia 12/2013    Per DEXA   • Pneumonia due to Staphylococcus (CMS/[facility]) 4/13/2011   • Pulmonary hemorrhage 01/01/2008   • RAD (reactive airway disease)    • Sinusitis, chronic    •  Staphylococcus aureus infection    • Uncomplicated senile dementia    • Urinary tract infection    • Vasculitis (CMS/HCC)    • Wegener's disease, pulmonary (CMS/HCC)    • Wegener's granulomatosis (CMS/HCC)        Past Surgical History:   Procedure Laterality Date   • Bronchoscopy with fluro guide     • Bunionectomy  1995    Left   • D and c  1982   • Fracture surgery     • Leg/ankle surgery proc unlisted  06/01/2004    Right ankle surgery   • Past surgical history  10/01/2007    Left lung biopsy/VATS procedure   • Past surgical history  1995    Bunionectomy, L foot   • Past surgical history  1999    Sinus surgery       ALLERGIES:   Allergen Reactions   • Codeine    • Pentamidine SHORTNESS OF BREATH   • Lantus [Insulin Glargine] RASH     The patient current takes Lantus. The patient states that she developed a rash after taking a new brand of Lantus. The brand that the patient is allergic to is Toujeo/solo star insulin glargine injection    • Ciprofloxacin RASH     Tolerates Levaquin   • Lantus Other (See Comments)   • Levemir [Insulin Detemir] Other (See Comments)     Lumps    • Msir [Morphine] PRURITUS     Patient seems to tolerate MS Contin without pruritis    • Omnicef [Cefdinir] DIARRHEA     Tolerated cefepime    • Quinolones    • Sulfa Antibiotics RASH       Medications : PTA MEDS reviewed in MAR     ROS : 10 systems review is negative except for pertinent to HPI    Family History   Problem Relation Age of Onset   • Stroke Mother    • Psychiatry Mother         mental illness   • Diabetes Brother         x2   • Hypertension Brother         x2   • Diabetes Brother    • Stroke Father    • High blood pressure Father    • Depression Father    • Psychiatric Father    • Thyroid Father    • Allergies Daughter    • Depression Daughter    • Kidney disease Daughter    • Other Daughter         headaches   • Allergies Daughter    • Other Daughter         headaches       Social History     Social History   • Marital status:       Spouse name: N/A   • Number of children: 3   • Years of education: N/A     Occupational History   •  [employer]     Social History Main Topics   • Smoking status: Former Smoker     Years: 5.00     Types: Cigarettes     Quit date: 1/1/1988   • Smokeless tobacco: Never Used      Comment: Pt reported smoking one pack per month   • Alcohol use 1.2 oz/week     2 Cans of beer per week      Comment: 1-2 times a week   • Drug use: No   • Sexual activity: No     Other Topics Concern   • Exercise Yes     walks some      Social History Narrative    Lives with daughter who works as .           Physical Exam   Visit Vitals  /64 (BP Location: [facility], Patient Position: Sitting)   Pulse 124   Temp 98.9 °F (37.2 °C) (Oral)   Resp 18   Ht 5' 1\" (1.549 m)   Wt 60.3 kg   LMP 06/25/2001   SpO2 94%   BMI 25.12 kg/m²     GEN : well developed ; pleasant and cooperative with evaluation. Does not appear in acute distress.   ENT no icterus, mild conjunctival pallor, EOMI, PERRLA  CHEST: wall without rash ; symmetric movement   CARD S1S2 regular, no MGR  LUNGS Clear To Auscultation bilat ; adequate expansion : good saturation with 2L NC   ABD +BS soft NTND, no organomegaly  EXT no edema, no synovitis  SKIN integrity intact  NEURO Alert and Oriented to time/person/place and condition; CN intact, no gross focal motor (5/5 strength symmetric) nor sensory deficits appreciated to light touch.     LABS and IMAGES   WBC (K/mcL)   Date Value   06/14/2018 10.8     HGB (g/dL)   Date Value   06/14/2018 13.0     PLT   Date Value   06/14/2018 348 K/mcL   02/12/2014 179 K/mcL   08/30/2012 183 K/uL     INR (no units)   Date Value   11/19/2017 1.0       BUN (mg/dL)   Date Value   06/14/2018 14     Creatinine (mg/dL)   Date Value   06/14/2018 0.83     Sodium (mmol/L)   Date Value   06/14/2018 130 (L)     Chloride (mmol/L)   Date Value   06/14/2018 92 (L)     Potassium (mmol/L)   Date Value   06/14/2018 3.6       AST/SGOT  (Units/L)   Date Value   06/14/2018 26     ALT/SGPT (Units/L)   Date Value   06/14/2018 22     No results found for: GGTP  ALK PHOSPHATASE (Units/L)   Date Value   06/14/2018 89     TOTAL BILIRUBIN (mg/dL)   Date Value   06/14/2018 0.3     Assessment and Plan :  1. Uncontrolled type 2 diabetes mellitus with hyperglycemia requiring insulin gtt for improved control. Last A1c on file is >14 in April. Has mild Ketones but GAP is closed, not DKA. For now plan will be to continue insulin gtt. May require involvement of endocrinology, given potential need for high dose steroids pending further defining if Wegener's is cause of other symptoms.     2. Recurrent Acute urinary tract infection : confirmed with new UA. Based on past cultures, will start on rocephin IV pending culture results and susceptibility.     3. Hyponatremia assoc. To Hyperglycemia : corrected within normal parameters of 136  ; monitor as needed     4. Dyspnea : worsening over course last few weeks with dry cough. In light of base diagnosis, will proceed with CT chest to assess parenchyma.     5. Wegener's Granulomatosis : seem has been on remicade, prednisone and cellcept : I will defer guidance to management to Dr Zeng. At this time will resume prednisone and cellcept pending additional testing and input     6. DVT prophylaxis : for now mechanical prophylaxis. Pending further eval consider addition of heparin     7. GI prophylaxis : given long use of steroids, continue PPI or H2B for PUD prophylaxis     8. Insomnia : resume melatonin and elavil       Goals of Care:    Patient is decisional: Yes  Patient has POA documents in the chart: Yes  Code Status: Full Code  Rationale behind code status: corroborated decisions documented on POC from 4/23/15 : refer to document for additional details   Goals of care: improvement of glycemia, urinary symptoms and dyspnea. Prompt return home       Sloane Sultana MD - Primary Care Provider            normal balance

## 2020-02-12 NOTE — PROGRESS NOTE ADULT - PROBLEM SELECTOR PLAN 3
monitor fingersticks TID AC  A1C 7.1  sliding scale insulin. while hospital.  Will start metformin 1000mg bid upon discharge.

## 2020-02-13 ENCOUNTER — TRANSCRIPTION ENCOUNTER (OUTPATIENT)
Age: 60
End: 2020-02-13

## 2020-02-13 VITALS
TEMPERATURE: 98 F | SYSTOLIC BLOOD PRESSURE: 130 MMHG | OXYGEN SATURATION: 94 % | RESPIRATION RATE: 18 BRPM | DIASTOLIC BLOOD PRESSURE: 91 MMHG | HEART RATE: 102 BPM

## 2020-02-13 PROCEDURE — 99238 HOSP IP/OBS DSCHRG MGMT 30/<: CPT

## 2020-02-13 PROCEDURE — 72100 X-RAY EXAM L-S SPINE 2/3 VWS: CPT

## 2020-02-13 PROCEDURE — 72148 MRI LUMBAR SPINE W/O DYE: CPT

## 2020-02-13 PROCEDURE — 85027 COMPLETE CBC AUTOMATED: CPT

## 2020-02-13 PROCEDURE — 83036 HEMOGLOBIN GLYCOSYLATED A1C: CPT

## 2020-02-13 PROCEDURE — 81001 URINALYSIS AUTO W/SCOPE: CPT

## 2020-02-13 PROCEDURE — 99285 EMERGENCY DEPT VISIT HI MDM: CPT

## 2020-02-13 PROCEDURE — 82962 GLUCOSE BLOOD TEST: CPT

## 2020-02-13 PROCEDURE — 87086 URINE CULTURE/COLONY COUNT: CPT

## 2020-02-13 PROCEDURE — 80053 COMPREHEN METABOLIC PANEL: CPT

## 2020-02-13 PROCEDURE — 90686 IIV4 VACC NO PRSV 0.5 ML IM: CPT

## 2020-02-13 PROCEDURE — 85610 PROTHROMBIN TIME: CPT

## 2020-02-13 PROCEDURE — 86803 HEPATITIS C AB TEST: CPT

## 2020-02-13 PROCEDURE — 97161 PT EVAL LOW COMPLEX 20 MIN: CPT

## 2020-02-13 PROCEDURE — 85730 THROMBOPLASTIN TIME PARTIAL: CPT

## 2020-02-13 RX ORDER — METFORMIN HYDROCHLORIDE 850 MG/1
1 TABLET ORAL
Qty: 60 | Refills: 0
Start: 2020-02-13 | End: 2020-03-13

## 2020-02-13 RX ORDER — SENNA PLUS 8.6 MG/1
2 TABLET ORAL
Qty: 60 | Refills: 0
Start: 2020-02-13 | End: 2020-03-13

## 2020-02-13 RX ORDER — DIAZEPAM 5 MG
1 TABLET ORAL
Qty: 10 | Refills: 0
Start: 2020-02-13 | End: 2020-02-17

## 2020-02-13 RX ORDER — OXYCODONE HYDROCHLORIDE 5 MG/1
1 TABLET ORAL
Qty: 20 | Refills: 0
Start: 2020-02-13 | End: 2020-02-17

## 2020-02-13 RX ADMIN — Medication 3: at 08:37

## 2020-02-13 RX ADMIN — NALOXEGOL OXALATE 25 MILLIGRAM(S): 12.5 TABLET, FILM COATED ORAL at 11:55

## 2020-02-13 RX ADMIN — CHLORHEXIDINE GLUCONATE 1 APPLICATION(S): 213 SOLUTION TOPICAL at 11:55

## 2020-02-13 RX ADMIN — Medication 6 MILLIGRAM(S): at 00:15

## 2020-02-13 RX ADMIN — Medication 10 MILLIGRAM(S): at 06:34

## 2020-02-13 RX ADMIN — Medication 3: at 13:22

## 2020-02-13 RX ADMIN — INFLUENZA VIRUS VACCINE 0.5 MILLILITER(S): 15; 15; 15; 15 SUSPENSION INTRAMUSCULAR at 13:53

## 2020-02-13 RX ADMIN — Medication 6 MILLIGRAM(S): at 06:34

## 2020-02-13 RX ADMIN — SENNA PLUS 2 TABLET(S): 8.6 TABLET ORAL at 06:34

## 2020-02-13 RX ADMIN — HEPARIN SODIUM 5000 UNIT(S): 5000 INJECTION INTRAVENOUS; SUBCUTANEOUS at 06:34

## 2020-02-13 RX ADMIN — Medication 6 MILLIGRAM(S): at 11:56

## 2020-02-13 NOTE — DISCHARGE NOTE PROVIDER - NSDCCPCAREPLAN_GEN_ALL_CORE_FT
PRINCIPAL DISCHARGE DIAGNOSIS  Diagnosis: Intractable back pain  Assessment and Plan of Treatment: Improved.  Continue pain medication as prescribed.  Follow up with your PMD in 1 week.      SECONDARY DISCHARGE DIAGNOSES  Diagnosis: Pre-diabetes  Assessment and Plan of Treatment: Take Mefrormin as prescribed.  Follow up with ethan PMD in 1 week.

## 2020-02-13 NOTE — DISCHARGE NOTE NURSING/CASE MANAGEMENT/SOCIAL WORK - PATIENT PORTAL LINK FT
You can access the FollowMyHealth Patient Portal offered by [facility] by registering at the following website: [email or web address]. By joining [facility]’s FollowMyHealth portal, you will also be able to view your health information using other applications (apps) compatible with our system.

## 2020-02-13 NOTE — DISCHARGE NOTE PROVIDER - NSDCMRMEDTOKEN_GEN_ALL_CORE_FT
Rolling Walker: metFORMIN 1000 mg oral tablet: 1 tab(s) orally 2 times a day   oxyCODONE 5 mg oral tablet: 1 tab(s) orally every 6 hours MDD:4  predniSONE 10 mg oral tablet: PREDNISONE TAPER:  40 mg x 3 days  30 mg x 3 days  20 mg x 3 days  10 mg x 3 days  Rolling Walker:   senna oral tablet: 2 tab(s) orally once a day (at bedtime)   Valium 5 mg oral tablet: 1 tab(s) orally 2 times a day MDD:2 metFORMIN 1000 mg oral tablet: 1 tab(s) orally 2 times a day   oxyCODONE 5 mg oral tablet: 1 tab(s) orally every 6 hours, As Needed for moderate to sever pain. MDD:4  predniSONE 10 mg oral tablet: PREDNISONE TAPER:  40 mg x 3 days  30 mg x 3 days  20 mg x 3 days  10 mg x 3 days  Rolling Walker:   senna oral tablet: 2 tab(s) orally once a day (at bedtime)   Valium 5 mg oral tablet: 1 tab(s) orally 2 times a day MDD:2

## 2020-02-13 NOTE — PHARMACOTHERAPY INTERVENTION NOTE - COMMENTS
Pharmacy intern discussed discharge medication name, dose, frequency, and side effects. Medication information handout provided from Med Essential Fact Sheet ([employer]®).

## 2020-02-13 NOTE — DISCHARGE NOTE PROVIDER - NSDCFUADDINST_GEN_ALL_CORE_FT
NO DRIVING FOR 1 WEEK - PER DR LAM.  FOLLOW UP WITH DR LAM IN 1 WEEK.  CALL TO SCHEDULE APPOINTMENT.

## 2020-02-13 NOTE — DISCHARGE NOTE PROVIDER - HOSPITAL COURSE
61 y/o M with pre-DM, admitted for intractable back pain.    Intractable back pain: discussed with neurosurgery    LSO brace - discussed should be used for comfort, and not long term    stressed importance of weight loss and core strengthening with PT once out of current flare.     Lumbar radiculopathy, right.  Pt for discharge tomorrow morning, with walker for prn use at home.    LSO Brace.    decrease valium to 5mg BID    oxycodone 5mg TID prn     plan for prednisone taper 40mg qd x 3 days, 30mg qd for 3days., 20mg qd for 3 days, then 10mg daily for 3 days    decrease decadron to 4mg q8 hours    Plan to discharge on Oxycodone  and senna.     Elevated glucose: monitor fingersticks TID AC    A1C 7.1    sliding scale insulin. while hospital.    Will start metformin 1000mg bid upon discharge.     Pt stable for discharge home today per Medical Attending Dr Waddell.

## 2020-02-13 NOTE — DISCHARGE NOTE PROVIDER - CARE PROVIDER_API CALL
Ja Waddell (DO)  Internal Medicine  [address] [address]  Phone: [phone]  Fax: [phone]  Follow Up Time: 1 week

## 2020-11-30 NOTE — ED ADULT TRIAGE NOTE - BP NONINVASIVE DIASTOLIC (MM HG)
EMERGENCY DEPARTMENT HISTORY AND PHYSICAL EXAM      Date: 11/29/2020  Patient Name: Flor Ramires    History of Presenting Illness     Chief Complaint   Patient presents with    Animal Bite       History Provided By: Patient    HPI: Flor Ramires, 50 y.o. male with a past medical history significant No significant past medical history presents to the ED with cc of dog bite to multiple locations including right arm and facial region onset just prior to arrival.  Patient reports that his family fosters mastiffs, and they have had this dog for the last 3 weeks. He noted abnormal behavior about 1 week ago and was concerned that the dog may turn on him at some point in time. They wanted to give the dog another chance however today he thought the dog had latched onto his wife's arm so he pulled the dog away from her. A few hours later, he was laying in bed with the dog when the dog attacked him biting his face and arm. This was an unprovoked attack. Unknown date of last tetanus shot for the patient. Patient also states that the dog is not up-to-date on his rabies vaccination. Unknown location of the dog prior to them receiving him in their care. Patient does note some pain over the wound sites. No other symptoms or complaints at this time. Animal control has been notified and is planning to pick the dog up in the morning. Patient is requesting rabies prophylaxis initiation. There are no other complaints, changes, or physical findings at this time. PCP: Tammy Welsh, DO    No current facility-administered medications on file prior to encounter. No current outpatient medications on file prior to encounter. Past History     Past Medical History:  History reviewed. No pertinent past medical history. Past Surgical History:  History reviewed. No pertinent surgical history. Family History:  History reviewed. No pertinent family history.     Social History:  Social History Tobacco Use    Smoking status: Never Smoker    Smokeless tobacco: Never Used   Substance Use Topics    Alcohol use: Not Currently     Frequency: Never    Drug use: Never       Allergies:  No Known Allergies      Review of Systems   Review of Systems   Constitutional: Negative for activity change, chills and fever. HENT: Negative for congestion, ear pain, rhinorrhea and trouble swallowing. Eyes: Negative for pain and visual disturbance. Respiratory: Negative for cough and shortness of breath. Cardiovascular: Negative for chest pain. Gastrointestinal: Negative for abdominal pain, diarrhea, nausea and vomiting. Genitourinary: Negative for decreased urine volume, difficulty urinating, dysuria and hematuria. Musculoskeletal: Positive for arthralgias and myalgias. Skin: Positive for wound. Negative for rash. Neurological: Negative for weakness and headaches. Hematological: Negative for adenopathy. Psychiatric/Behavioral: The patient is not nervous/anxious. All other systems reviewed and are negative. Physical Exam   Physical Exam  Vitals signs and nursing note reviewed. Constitutional:       General: He is not in acute distress. Appearance: He is well-developed. HENT:      Head: Normocephalic. Laceration present. Comments: Small puncture wound to right lower outer lip, not involving vermilion border, no other mouth injury     Mouth/Throat:      Mouth: Mucous membranes are moist.   Eyes:      Extraocular Movements: Extraocular movements intact. Pupils: Pupils are equal, round, and reactive to light. Visual Fields: Right eye visual fields normal and left eye visual fields normal.   Cardiovascular:      Rate and Rhythm: Normal rate and regular rhythm. Pulmonary:      Effort: Pulmonary effort is normal. No respiratory distress. Abdominal:      General: Abdomen is flat.    Musculoskeletal:      Comments: Right wrist: +soft tissue swelling, non-painful ROM, distal pulse intact, cap refill <3 seconds,  strength intact   Skin:     General: Skin is warm and dry. Capillary Refill: Capillary refill takes less than 2 seconds. Findings: No rash. Comments: Right forearm: 3  Linear superficial abrasions to the dorsum, 1 puncture wound to the ventral side. Face: multiple superficial abrasions to the right cheek, 1 below the right lower lip, puncture wound noted to right parietal scalp, 1.5cm flap laceration to right eyebrow. No active bleeding   Neurological:      General: No focal deficit present. Mental Status: He is alert. Cranial Nerves: No cranial nerve deficit. Psychiatric:         Mood and Affect: Mood normal.         Behavior: Behavior normal.         Diagnostic Study Results     Labs -   No results found for this or any previous visit (from the past 48 hour(s)). Radiologic Studies -   Results from [place] encounter on 11/29/20   XR WRIST RT AP/LAT/OBL MIN 3V    Narrative History: Dog bite. 3 views. 3 images of the left wrist show normal bone mineralization. No acute fracture. Contour of the distal metacarpal suggests old injury. No radiopaque foreign body. No soft tissue gas bubble. Impression IMPRESSION: No acute bony injury or deep soft tissue gas bubble. Old fifth  metacarpal fracture. CT Results  (Last 48 hours)    None          Medical Decision Making   I am the first provider for this patient. I reviewed the vital signs, available nursing notes, past medical history, past surgical history, family history and social history. Vital Signs-Reviewed the patient's vital signs.   Patient Vitals for the past 12 hrs:   Temp Pulse Resp BP SpO2   11/29/20 2240 98.2 °F (36.8 °C) 67 18 (!) 141/97 98 %       Records Reviewed: Nursing Notes    Provider Notes (Medical Decision Making):       MDM  Number of Diagnoses or Management Options  Dog bite, initial encounter:   Encounter for prophylactic administration of rabies immune globulin:   Facial laceration, initial encounter:   Multiple puncture wounds:   Need for tetanus booster:   Diagnosis management comments: 67-year-old male presenting with multiple puncture wounds and abrasions secondary to dog bite. Dog was being fostered by their family however is not up-to-date on his rabies vaccination. Unprovoked attack. Abnormal behavior over the last few weeks. Will update patient's tetanus vaccination, provide dose of Augmentin tonight and send prescription to pharmacy. We will also provide rabies prophylaxis. Patient voices understanding of the plan. Amount and/or Complexity of Data Reviewed  Tests in the radiology section of CPT®: ordered and reviewed        ED Course:   Initial assessment performed. The patients presenting problems have been discussed, and they are in agreement with the care plan formulated and outlined with them. I have encouraged them to ask questions as they arise throughout their visit. PROCEDURES    Wound Repair    Date/Time: 11/30/2020 12:30 AM  Performed by: PAPreparation: skin prepped with Betadine  Pre-procedure re-eval: Immediately prior to the procedure, the patient was reevaluated and found suitable for the planned procedure and any planned medications. Time out: Immediately prior to the procedure a time out was called to verify the correct patient, procedure, equipment, staff and marking as appropriate. .  Location details: right eyebrow  Wound length:2.5 cm or less  Anesthesia: local infiltration    Anesthesia:  Local Anesthetic: lidocaine 1% without epinephrine  Anesthetic total: 2 mL  Foreign bodies: no foreign bodies  Irrigation solution: saline  Irrigation method: jet lavage (copious)  Skin closure: 5-0 nylon  Number of sutures: 1  Technique: simple and interrupted  Approximation: loose  Patient tolerance: Patient tolerated the procedure well with no immediate complications  My total time at bedside, performing this procedure was 16-30 minutes. Comments: Multiple wounds irrigated with normal saline, copious jet lavage irrigation, wounds cleaned with betadine, no active bleeding, no indication for closure to other wound sites         DISPOSITION    Discharged    PLAN:  1. Discharge Medication List as of 11/30/2020  1:32 AM      START taking these medications    Details   amoxicillin-clavulanate (Augmentin) 875-125 mg per tablet Take 1 Tab by mouth two (2) times a day for 10 days. , Normal, Disp-20 Tab,R-0            2.   Follow-up Information     Follow up With Specialties Details Why [address] [place] [address]    [address] [facility] EMERGENCY DEPT Emergency Medicine  in 7-10 days for suture removal [address] [facility] [address] [phone]    Edra Ariella, DO Family Medicine Schedule an appointment as soon as possible for a visit for follow up from ER visit [address] [facility] [address]  [phone]      [address] [facility] EMERGENCY DEPT Emergency Medicine  As needed, If symptoms worsen [facility] [facility] [address] [phone]        Return to ED if worse     Diagnosis     Clinical Impression:   1. Dog bite, initial encounter    2. Facial laceration, initial encounter    3. Multiple puncture wounds    4. Need for tetanus booster    5.  Encounter for prophylactic administration of rabies immune globulin 70

## 2021-03-21 ENCOUNTER — TRANSCRIPTION ENCOUNTER (OUTPATIENT)
Age: 61
End: 2021-03-21

## 2021-09-28 ENCOUNTER — TRANSCRIPTION ENCOUNTER (OUTPATIENT)
Age: 61
End: 2021-09-28

## 2022-01-06 NOTE — ED ADULT TRIAGE NOTE - BSA (M2)
Problem: Discharge Planning:  Goal: Discharged to appropriate level of care  Description: Discharged to appropriate level of care  Outcome: Ongoing  Note: Discharge planning continues     Problem: Constipation:  Goal: Bowel elimination is within specified parameters  Description: Bowel elimination is within specified parameters  Outcome: Ongoing  Note: No BM passing gas     Problem: Fluid Volume - Imbalance:  Goal: Absence of postpartum hemorrhage signs and symptoms  Description: Absence of postpartum hemorrhage signs and symptoms  Outcome: Ongoing  Note: Small amount of lochia, no clots reported     Problem: Infection - Risk of, Puerperal Infection:  Goal: Will show no infection signs and symptoms  Description: Will show no infection signs and symptoms  Outcome: Ongoing  Note: Vitals stable, no s/sx of infection     Problem: Mood - Altered:  Goal: Mood stable  Description: Mood stable  Outcome: Ongoing  Note: Stable mood expresses needs     Problem: Pain - Acute:  Goal: Pain level will decrease  Description: Pain level will decrease  Outcome: Ongoing  Note: Pain controlled well with Motrin, Tylenol, rest, repositioning, pain goal 3   Care plan reviewed with patient and SO. Patient and So verbalize understanding of the plan of care and contribute to goal setting. 2.18

## 2022-05-14 ENCOUNTER — NON-APPOINTMENT (OUTPATIENT)
Age: 62
End: 2022-05-14

## 2022-08-02 NOTE — DISCHARGE NOTE NURSING/CASE MANAGEMENT/SOCIAL WORK - NSCORESITESY/N_GEN_A_CORE_RD
· Patient with history of moderate persistent asthma  · No wheezing on exam has patient will also with lower extremity edema weight gain  · Do not feel this is an asthma exacerbation by represents an exacerbation of congestive heart failure No

## 2022-09-26 PROBLEM — Z78.9 OTHER SPECIFIED HEALTH STATUS: Chronic | Status: ACTIVE | Noted: 2020-02-09

## 2022-09-27 ENCOUNTER — NON-APPOINTMENT (OUTPATIENT)
Age: 62
End: 2022-09-27

## 2022-09-27 ENCOUNTER — APPOINTMENT (OUTPATIENT)
Dept: OPHTHALMOLOGY | Facility: CLINIC | Age: 62
End: 2022-09-27

## 2022-09-27 PROCEDURE — 92004 COMPRE OPH EXAM NEW PT 1/>: CPT

## 2022-10-07 ENCOUNTER — APPOINTMENT (OUTPATIENT)
Dept: OPHTHALMOLOGY | Facility: CLINIC | Age: 62
End: 2022-10-07

## 2023-03-09 NOTE — PROGRESS NOTE ADULT - ASSESSMENT
59 y/o M with pre-DM, admitted for intractable back pain
61 y/o M with pre-DM, admitted for intractable back pain
61 y/o M with pre-DM, admitted for intractable back pain
Patient is a 59y old  Male with intractable low back pain with right sciatica.    Plan to discharge pt home tomorrow.  Continue percocet PRN and valium PRN.  Suggest to taper valium down to 5 mg BID x 5 days at discharge, then discontinue.  Suggest robaxin 750 mg Q6 hours PRN at home x 2 weeks.  Bowel regimen.    LSO brace not advisable for lumbar HNP or for long term use.  Would suggest outpatient PT.     Will sign off at this time.    Minutes spent on total encounter: 15 minutes      [facility]  [phone]
Vacuum Extraction was not used

## 2023-07-15 NOTE — ED ADULT TRIAGE NOTE - INTERNATIONAL TRAVEL
Orthopaedic Surgery Progress Note 07/15/2023    S: No acute events overnight.  Pain well controlled. Denies numbness or tingling. Denies chest pain, SOB, nausea/vomiting. Denies fevers/chills. Denies any other concerns.     O:  Temp: 97.5  F (36.4  C) Temp src: Oral BP: (!) 99/39 Pulse: 64   Resp: 16 SpO2: 95 % O2 Device: None (Room air) Oxygen Delivery: 1 LPM    Exam:  Gen: No acute distress, resting comfortably in bed.  Resp: Non-labored breathing    RLE:  Dressings c/d/i   Abduction pillow in place  Fires GS/TS/FHL/EHL  SILT SP/DP/Saph/Sural/T  2+ DP/PT, WWP    No lab results found in last 7 days.    Invalid input(s): SEDRATE    Assessment: Genaro Ortiz is a 74 year old male s/p R JOHNNY on 7/14. Doing well.    Plan:  WBAT with posterior hip precautions  Postop abx: ancef x24h  Pain control  DVT ppx: aspirin 162mg daily for 4 weeks  PT/OT, OOB  Appreciate medicine recs  Plan for L JOHNNY next Friday  Dispo planning    Future Appointments   Date Time Provider Department Center   7/15/2023  2:00 PM Eric Victor, PEG [facility]   8/28/2023 11:00 AM Merritt Toney, PA-C [facility]   10/4/2023  9:00 AM Giovanni Shah MD [facility]   11/7/2023  1:30 PM [facility]   12/8/2023  1:15 PM Valdez Quesada DO [facility]       Jody Acosta MD  Adult Reconstructive Surgery Fellow  Department of Orthopaedic Surgery, [facility]     No

## 2023-10-12 ENCOUNTER — OFFICE (OUTPATIENT)
Dept: URBAN - METROPOLITAN AREA CLINIC 90 | Facility: CLINIC | Age: 63
Setting detail: OPHTHALMOLOGY
End: 2023-10-12
Payer: MEDICARE

## 2023-10-12 VITALS — HEIGHT: 60 IN

## 2023-10-12 DIAGNOSIS — E11.9: ICD-10-CM

## 2023-10-12 DIAGNOSIS — H52.7: ICD-10-CM

## 2023-10-12 DIAGNOSIS — H43.393: ICD-10-CM

## 2023-10-12 DIAGNOSIS — H11.153: ICD-10-CM

## 2023-10-12 DIAGNOSIS — H01.004: ICD-10-CM

## 2023-10-12 DIAGNOSIS — H40.011: ICD-10-CM

## 2023-10-12 DIAGNOSIS — H00.14: ICD-10-CM

## 2023-10-12 DIAGNOSIS — H01.001: ICD-10-CM

## 2023-10-12 DIAGNOSIS — H35.371: ICD-10-CM

## 2023-10-12 DIAGNOSIS — H25.13: ICD-10-CM

## 2023-10-12 DIAGNOSIS — H52.4: ICD-10-CM

## 2023-10-12 PROCEDURE — 92133 CPTRZD OPH DX IMG PST SGM ON: CPT | Performed by: OPHTHALMOLOGY

## 2023-10-12 PROCEDURE — 92004 COMPRE OPH EXAM NEW PT 1/>: CPT | Performed by: OPHTHALMOLOGY

## 2023-10-12 PROCEDURE — 92015 DETERMINE REFRACTIVE STATE: CPT | Performed by: OPHTHALMOLOGY

## 2023-10-12 ASSESSMENT — REFRACTION_AUTOREFRACTION
OS_SPHERE: -3.00
OS_CYLINDER: -1.25
OD_SPHERE: -4.75
OD_CYLINDER: -1.00
OS_AXIS: 164
OD_AXIS: 122

## 2023-10-12 ASSESSMENT — REFRACTION_MANIFEST
OS_SPHERE: -3.75
OS_ADD: +2.50
OD_VA1: 20/25+3
OD_AXIS: 129
OU_VA: 20/20+2
OD_SPHERE: -5.00
OS_CYLINDER: -1.75
OD_ADD: +2.50
OD_SPHERE: -4.25
OS_ADD: +2.50
OD_CYLINDER: -0.75
OD_VA1: 20/20-
OS_VA1: 20/15-
OS_CYLINDER: -1.50
OD_VA2: 20/20
OD_AXIS: 125
OS_VA2: 20/20
OS_SPHERE: -3.25
OS_AXIS: 162
OD_CYLINDER: -1.00
OD_ADD: +2.50
OS_AXIS: 160
OS_VA1: 20/20

## 2023-10-12 ASSESSMENT — LID EXAM ASSESSMENTS
OS_BLEPHARITIS: LUL T
OD_BLEPHARITIS: RUL T

## 2023-10-12 ASSESSMENT — REFRACTION_CURRENTRX
OS_SPHERE: -3.25
OS_OVR_VA: 20/
OD_AXIS: 133
OD_ADD: +1.50
OD_CYLINDER: -1.00
OS_CYLINDER: -1.75
OS_AXIS: 153
OD_SPHERE: -4.25
OS_AXIS: 157
OD_OVR_VA: 20/
OS_ADD: +1.50
OS_ADD: +2.50
OD_SPHERE: -5.25
OD_OVR_VA: 20/
OD_AXIS: 132
OS_CYLINDER: -1.75
OD_VPRISM_DIRECTION: PROGS
OD_CYLINDER: -1.00
OS_SPHERE: -4.00
OD_ADD: +2.50
OS_VPRISM_DIRECTION: PROGS
OS_OVR_VA: 20/

## 2023-10-12 ASSESSMENT — SPHEQUIV_DERIVED
OD_SPHEQUIV: -5.5
OD_SPHEQUIV: -4.625
OS_SPHEQUIV: -3.625
OS_SPHEQUIV: -4.625
OD_SPHEQUIV: -5.25
OS_SPHEQUIV: -4

## 2023-10-12 ASSESSMENT — KERATOMETRY
OD_AXISANGLE_DEGREES: 74
OD_K2POWER_DIOPTERS: 44.25
OS_AXISANGLE_DEGREES: 080
METHOD_AUTO_MANUAL: AUTO
OS_K1POWER_DIOPTERS: 43.00
OS_K2POWER_DIOPTERS: 44.50
OD_K1POWER_DIOPTERS: 43.50

## 2023-10-12 ASSESSMENT — AXIALLENGTH_DERIVED
OD_AL: 25.7832
OS_AL: 24.9907
OD_AL: 25.3824
OS_AL: 25.4356
OD_AL: 25.6674
OS_AL: 25.1557

## 2023-10-12 ASSESSMENT — LID POSITION - LOWER LID LAG
OD_LOWER_LID_LAG: RLL 2+
OS_LOWER_LID_LAG: LLL 2+

## 2023-10-12 ASSESSMENT — TONOMETRY
OD_IOP_MMHG: 17
OS_IOP_MMHG: 17

## 2023-10-12 ASSESSMENT — VISUAL ACUITY
OD_BCVA: 20/30-+2
OS_BCVA: 20/30-2

## 2023-10-12 ASSESSMENT — LID POSITION - DERMATOCHALASIS
OD_DERMATOCHALASIS: RLL 1+
OS_DERMATOCHALASIS: LLL 1+

## 2024-08-30 ENCOUNTER — RX ONLY (RX ONLY)
Age: 64
End: 2024-08-30

## 2024-08-30 ENCOUNTER — OFFICE (OUTPATIENT)
Facility: LOCATION | Age: 64
Setting detail: OPHTHALMOLOGY
End: 2024-08-30
Payer: MEDICARE

## 2024-08-30 DIAGNOSIS — H00.14: ICD-10-CM

## 2024-08-30 DIAGNOSIS — H11.153: ICD-10-CM

## 2024-08-30 DIAGNOSIS — H01.001: ICD-10-CM

## 2024-08-30 DIAGNOSIS — H01.004: ICD-10-CM

## 2024-08-30 DIAGNOSIS — H20.00: ICD-10-CM

## 2024-08-30 PROCEDURE — 99213 OFFICE O/P EST LOW 20 MIN: CPT | Performed by: OPTOMETRIST

## 2024-08-30 ASSESSMENT — LID EXAM ASSESSMENTS
OS_BLEPHARITIS: LUL T
OD_BLEPHARITIS: RUL T

## 2024-08-30 ASSESSMENT — CONFRONTATIONAL VISUAL FIELD TEST (CVF)
OS_FINDINGS: FULL
OD_FINDINGS: FULL

## 2024-08-30 ASSESSMENT — LID POSITION - DERMATOCHALASIS
OD_DERMATOCHALASIS: RLL 1+
OS_DERMATOCHALASIS: LLL 1+

## 2024-08-30 ASSESSMENT — LID POSITION - LOWER LID LAG
OD_LOWER_LID_LAG: RLL 2+
OS_LOWER_LID_LAG: LLL 2+

## 2024-09-06 ENCOUNTER — OFFICE (OUTPATIENT)
Facility: LOCATION | Age: 64
Setting detail: OPHTHALMOLOGY
End: 2024-09-06
Payer: MEDICARE

## 2024-09-06 DIAGNOSIS — H00.14: ICD-10-CM

## 2024-09-06 DIAGNOSIS — H20.00: ICD-10-CM

## 2024-09-06 DIAGNOSIS — H01.001: ICD-10-CM

## 2024-09-06 DIAGNOSIS — H11.153: ICD-10-CM

## 2024-09-06 DIAGNOSIS — H01.004: ICD-10-CM

## 2024-09-06 PROCEDURE — 99212 OFFICE O/P EST SF 10 MIN: CPT | Performed by: OPTOMETRIST

## 2024-09-06 ASSESSMENT — LID EXAM ASSESSMENTS
OS_BLEPHARITIS: LUL T
OD_BLEPHARITIS: RUL T

## 2024-09-06 ASSESSMENT — LID POSITION - LOWER LID LAG
OS_LOWER_LID_LAG: LLL 2+
OD_LOWER_LID_LAG: RLL 2+

## 2024-09-06 ASSESSMENT — LID POSITION - DERMATOCHALASIS
OS_DERMATOCHALASIS: LLL 1+
OD_DERMATOCHALASIS: RLL 1+

## 2024-09-17 NOTE — CHART NOTE - NSCHARTNOTESELECT_GEN_ALL_CORE
[Upper back] : upper back Event Note [Mid-back] : mid-back [6] : 6 [Dull/Aching] : dull/aching [Intermittent] : intermittent [Meds] : meds [Sitting] : sitting [Standing] : standing [Walking] : walking [Exercising] : exercising [Stairs] : stairs [Lying in bed] : lying in bed [FreeTextEntry1] : RT [id number] [facility]- 8/14/2024 with 70% relief  _______________________ TPI-7/22/2024 [] : no [FreeTextEntry9] : tpi  [TWNoteComboBox1] : 70% [TextEntry] : mri follow up , MD will review images and report

## 2024-10-15 ENCOUNTER — OFFICE (OUTPATIENT)
Facility: LOCATION | Age: 64
Setting detail: OPHTHALMOLOGY
End: 2024-10-15
Payer: MEDICARE

## 2024-10-15 DIAGNOSIS — H43.812: ICD-10-CM

## 2024-10-15 DIAGNOSIS — H40.013: ICD-10-CM

## 2024-10-15 DIAGNOSIS — H00.14: ICD-10-CM

## 2024-10-15 DIAGNOSIS — H35.373: ICD-10-CM

## 2024-10-15 PROBLEM — E11.3293 TYPE 2 DIABETES MELLITUS WITH MILD NONPROLIFERATIVE DIABETIC RETINOPATHY WITHOUT MACULAR EDEMA; BOTH EYES: Status: ACTIVE | Noted: 2024-10-15

## 2024-10-15 PROCEDURE — 92014 COMPRE OPH EXAM EST PT 1/>: CPT | Performed by: OPTOMETRIST

## 2024-10-15 PROCEDURE — 92134 CPTRZ OPH DX IMG PST SGM RTA: CPT | Performed by: OPTOMETRIST

## 2024-10-15 PROCEDURE — 92202 OPSCPY EXTND ON/MAC DRAW: CPT | Performed by: OPTOMETRIST

## 2024-10-15 ASSESSMENT — REFRACTION_CURRENTRX
OS_CYLINDER: -1.75
OD_OVR_VA: 20/
OD_VPRISM_DIRECTION: PROGS
OS_SPHERE: -3.25
OS_ADD: +1.50
OD_AXIS: 132
OS_OVR_VA: 20/
OS_AXIS: 157
OD_SPHERE: -5.25
OD_AXIS: 133
OS_ADD: +2.50
OD_ADD: +1.50
OS_VPRISM_DIRECTION: PROGS
OS_OVR_VA: 20/
OD_CYLINDER: -1.00
OD_SPHERE: -4.25
OS_SPHERE: -4.00
OS_CYLINDER: -1.75
OD_ADD: +2.50
OD_CYLINDER: -1.00
OD_OVR_VA: 20/
OS_AXIS: 153

## 2024-10-15 ASSESSMENT — REFRACTION_MANIFEST
OS_ADD: +2.50
OS_VA1: 20/30
OD_VA1: 20/25+3
OD_SPHERE: -5.50
OD_CYLINDER: -1.25
OS_CYLINDER: -2.75
OS_AXIS: 160
OS_SPHERE: -3.50
OS_SPHERE: -3.25
OD_VA2: 20/20
OD_ADD: +2.50
OD_AXIS: 127
OD_CYLINDER: -1.00
OD_CYLINDER: -1.00
OS_ADD: +2.50
OS_SPHERE: -2.25
OS_VA1: 20/20
OD_ADD: +2.50
OU_VA: 20/20+2
OD_SPHERE: -5.50
OS_CYLINDER: -1.00
OD_VA1: 20/30-1
OD_AXIS: 125
OS_CYLINDER: -1.50
OD_VA1: 20/40
OS_AXIS: 25
OS_AXIS: 137
OS_VA1: 20/60-2
OD_SPHERE: -5.00
OS_VA2: 20/20
OD_AXIS: 126

## 2024-10-15 ASSESSMENT — LID POSITION - LOWER LID LAG
OS_LOWER_LID_LAG: LLL 2+
OD_LOWER_LID_LAG: RLL 2+

## 2024-10-15 ASSESSMENT — LID EXAM ASSESSMENTS
OS_BLEPHARITIS: LUL T
OD_BLEPHARITIS: RUL T

## 2024-10-15 ASSESSMENT — REFRACTION_AUTOREFRACTION
OS_AXIS: 125
OS_CYLINDER: -2.75
OD_AXIS: 127
OD_CYLINDER: -1.25
OS_SPHERE: -2.25
OD_SPHERE: -5.50

## 2024-10-15 ASSESSMENT — KERATOMETRY
OS_K1POWER_DIOPTERS: 43.00
OD_K1POWER_DIOPTERS: 43.50
METHOD_AUTO_MANUAL: AUTO
OS_AXISANGLE_DEGREES: 080
OS_K2POWER_DIOPTERS: 44.50
OD_K2POWER_DIOPTERS: 44.00
OD_AXISANGLE_DEGREES: 055

## 2024-10-15 ASSESSMENT — VISUAL ACUITY
OS_BCVA: 20/
OD_BCVA: 20/

## 2024-10-15 ASSESSMENT — LID POSITION - DERMATOCHALASIS
OD_DERMATOCHALASIS: RLL 1+
OS_DERMATOCHALASIS: LLL 1+

## 2024-10-15 ASSESSMENT — TONOMETRY
OD_IOP_MMHG: 14
OS_IOP_MMHG: 13

## 2024-10-15 ASSESSMENT — CONFRONTATIONAL VISUAL FIELD TEST (CVF)
OS_FINDINGS: FULL
OD_FINDINGS: FULL

## 2025-01-28 ENCOUNTER — OFFICE (OUTPATIENT)
Facility: LOCATION | Age: 65
Setting detail: OPHTHALMOLOGY
End: 2025-01-28
Payer: MEDICARE

## 2025-01-28 DIAGNOSIS — H25.13: ICD-10-CM

## 2025-01-28 DIAGNOSIS — H11.153: ICD-10-CM

## 2025-01-28 DIAGNOSIS — H40.013: ICD-10-CM

## 2025-01-28 PROCEDURE — 92133 CPTRZD OPH DX IMG PST SGM ON: CPT | Performed by: OPTOMETRIST

## 2025-01-28 PROCEDURE — 99213 OFFICE O/P EST LOW 20 MIN: CPT | Performed by: OPTOMETRIST

## 2025-01-28 ASSESSMENT — REFRACTION_AUTOREFRACTION
OD_CYLINDER: +1.25
OD_AXIS: 119
OS_SPHERE: -3.25
OS_CYLINDER: -1.25
OS_AXIS: 159
OD_SPHERE: -5.25

## 2025-01-28 ASSESSMENT — REFRACTION_CURRENTRX
OS_OVR_VA: 20/
OS_ADD: +2.50
OD_AXIS: 132
OS_AXIS: 153
OD_SPHERE: -4.25
OS_AXIS: 157
OD_OVR_VA: 20/
OD_SPHERE: -5.25
OS_SPHERE: -3.25
OS_CYLINDER: -1.75
OD_OVR_VA: 20/
OD_ADD: +1.50
OS_ADD: +1.50
OD_CYLINDER: -1.00
OD_AXIS: 133
OS_CYLINDER: -1.75
OD_VPRISM_DIRECTION: PROGS
OS_VPRISM_DIRECTION: PROGS
OD_ADD: +2.50
OS_SPHERE: -4.00
OD_CYLINDER: -1.00
OS_OVR_VA: 20/

## 2025-01-28 ASSESSMENT — KERATOMETRY
METHOD_AUTO_MANUAL: MANUAL
OD_K1POWER_DIOPTERS: 43.25
OS_K2POWER_DIOPTERS: 44.50
OD_K2POWER_DIOPTERS: 44.25
OS_AXISANGLE_DEGREES: 077
OS_K1POWER_DIOPTERS: 43.00
OD_AXISANGLE_DEGREES: 050

## 2025-01-28 ASSESSMENT — TONOMETRY
OS_IOP_MMHG: 16
OD_IOP_MMHG: 17

## 2025-01-28 ASSESSMENT — REFRACTION_MANIFEST
OD_AXIS: 125
OD_CYLINDER: +1.25
OD_SPHERE: -4.50
OS_SPHERE: -3.75
OS_AXIS: 160
OS_VA1: 20/20
OD_VA1: 20/30-2
OS_CYLINDER: -1.25
OD_CYLINDER: -1.00
OS_AXIS: 159
OS_ADD: +2.50
OS_CYLINDER: -1.50
OD_ADD: +2.50
OD_SPHERE: -5.25
OD_VA1: 20/30-2
OD_AXIS: 119
OS_VA1: 20/20
OS_SPHERE: -3.25

## 2025-01-28 ASSESSMENT — LID POSITION - LOWER LID LAG
OS_LOWER_LID_LAG: LLL 2+
OD_LOWER_LID_LAG: RLL 2+

## 2025-01-28 ASSESSMENT — LID EXAM ASSESSMENTS
OD_BLEPHARITIS: RUL T
OS_BLEPHARITIS: LUL T

## 2025-01-28 ASSESSMENT — LID POSITION - DERMATOCHALASIS
OS_DERMATOCHALASIS: LLL 1+
OD_DERMATOCHALASIS: RLL 1+

## 2025-01-28 ASSESSMENT — VISUAL ACUITY
OS_BCVA: 20/
OD_BCVA: 20/